# Patient Record
Sex: FEMALE | Race: WHITE | NOT HISPANIC OR LATINO | Employment: OTHER | URBAN - METROPOLITAN AREA
[De-identification: names, ages, dates, MRNs, and addresses within clinical notes are randomized per-mention and may not be internally consistent; named-entity substitution may affect disease eponyms.]

---

## 2024-01-04 DIAGNOSIS — E78.5 HYPERLIPIDEMIA, UNSPECIFIED: ICD-10-CM

## 2024-01-05 DIAGNOSIS — E78.5 HYPERLIPIDEMIA, UNSPECIFIED: ICD-10-CM

## 2024-01-05 DIAGNOSIS — N95.9 POST MENOPAUSAL PROBLEMS: ICD-10-CM

## 2024-01-05 DIAGNOSIS — M47.9 SPONDYLOSIS, UNSPECIFIED: ICD-10-CM

## 2024-01-05 PROBLEM — H91.22 SUDDEN IDIOPATHIC HEARING LOSS OF LEFT EAR WITH UNRESTRICTED HEARING OF RIGHT EAR: Status: ACTIVE | Noted: 2020-06-16

## 2024-01-05 PROBLEM — K63.5 COLON POLYPS: Status: ACTIVE | Noted: 2024-01-05

## 2024-01-05 PROBLEM — E77.8 HYPOPROTEINEMIA (MULTI): Status: ACTIVE | Noted: 2024-01-05

## 2024-01-05 PROBLEM — E83.51 HYPOCALCEMIA: Status: ACTIVE | Noted: 2024-01-05

## 2024-01-05 PROBLEM — J43.2 CENTRILOBULAR EMPHYSEMA (MULTI): Status: ACTIVE | Noted: 2019-04-05

## 2024-01-05 PROBLEM — H93.12 TINNITUS, LEFT EAR: Status: ACTIVE | Noted: 2020-06-09

## 2024-01-05 PROBLEM — D72.820 ATYPICAL LYMPHOCYTOSIS: Status: ACTIVE | Noted: 2024-01-05

## 2024-01-05 PROBLEM — M54.9 BACK PAIN: Status: ACTIVE | Noted: 2024-01-05

## 2024-01-05 PROBLEM — R35.0 URINARY FREQUENCY: Status: ACTIVE | Noted: 2024-01-05

## 2024-01-05 PROBLEM — F17.200 NICOTINE USE DISORDER: Status: ACTIVE | Noted: 2023-01-19

## 2024-01-05 PROBLEM — I34.1 MITRAL VALVE PROLAPSE SYNDROME: Status: ACTIVE | Noted: 2024-01-05

## 2024-01-05 PROBLEM — K21.9 GERD (GASTROESOPHAGEAL REFLUX DISEASE): Status: ACTIVE | Noted: 2024-01-05

## 2024-01-05 PROBLEM — L81.9 HYPERPIGMENTATION: Status: ACTIVE | Noted: 2024-01-05

## 2024-01-05 PROBLEM — R06.02 SHORTNESS OF BREATH: Status: ACTIVE | Noted: 2024-01-05

## 2024-01-05 PROBLEM — D64.9 ANEMIA: Status: ACTIVE | Noted: 2024-01-05

## 2024-01-05 PROBLEM — U07.1 COVID-19 VIRUS INFECTION: Status: ACTIVE | Noted: 2024-01-05

## 2024-01-05 PROBLEM — R42 DIZZINESS AND GIDDINESS: Status: ACTIVE | Noted: 2020-06-09

## 2024-01-05 PROBLEM — H91.8X3 ASYMMETRICAL HEARING LOSS: Status: ACTIVE | Noted: 2020-06-09

## 2024-01-05 PROBLEM — J30.9 ALLERGIC RHINITIS: Status: ACTIVE | Noted: 2024-01-05

## 2024-01-05 PROBLEM — F41.8 DEPRESSION WITH ANXIETY: Status: ACTIVE | Noted: 2024-01-05

## 2024-01-05 PROBLEM — E79.0 HYPERURICEMIA: Status: ACTIVE | Noted: 2024-01-05

## 2024-01-05 PROBLEM — I47.9 PAROXYSMAL TACHYCARDIA (MULTI): Status: ACTIVE | Noted: 2024-01-05

## 2024-01-05 PROBLEM — J44.9 CHRONIC OBSTRUCTIVE PULMONARY DISEASE (MULTI): Status: ACTIVE | Noted: 2023-01-19

## 2024-01-05 PROBLEM — E07.9 THYROID DISORDER: Status: ACTIVE | Noted: 2024-01-05

## 2024-01-05 RX ORDER — ATORVASTATIN CALCIUM 40 MG/1
60 TABLET, FILM COATED ORAL NIGHTLY
Qty: 135 TABLET | Refills: 1 | Status: SHIPPED | OUTPATIENT
Start: 2024-01-05

## 2024-01-05 RX ORDER — ESTROGENS, CONJUGATED 1.25 MG/1
1.25 TABLET, FILM COATED ORAL DAILY
Qty: 90 TABLET | Refills: 3 | Status: SHIPPED | OUTPATIENT
Start: 2024-01-05

## 2024-01-05 RX ORDER — ACETAMINOPHEN 500 MG
2000 TABLET ORAL 2 TIMES DAILY
COMMUNITY

## 2024-01-05 RX ORDER — PANTOPRAZOLE SODIUM 40 MG/1
40 TABLET, DELAYED RELEASE ORAL DAILY
COMMUNITY

## 2024-01-05 RX ORDER — LEVOTHYROXINE SODIUM 88 UG/1
88 TABLET ORAL EVERY OTHER DAY
COMMUNITY

## 2024-01-05 RX ORDER — CETIRIZINE HYDROCHLORIDE 10 MG/1
1 TABLET ORAL DAILY
COMMUNITY

## 2024-01-05 RX ORDER — ESTROGENS, CONJUGATED 1.25 MG/1
1.25 TABLET, FILM COATED ORAL DAILY
COMMUNITY
End: 2024-01-05 | Stop reason: SDUPTHER

## 2024-01-05 RX ORDER — LEVALBUTEROL TARTRATE 45 UG/1
AEROSOL, METERED ORAL
COMMUNITY

## 2024-01-05 RX ORDER — LEVALBUTEROL INHALATION SOLUTION 0.63 MG/3ML
0.63 SOLUTION RESPIRATORY (INHALATION) EVERY 4 HOURS PRN
COMMUNITY
Start: 2023-11-01

## 2024-01-05 RX ORDER — MELOXICAM 15 MG/1
15 TABLET ORAL DAILY
Qty: 90 TABLET | Refills: 1 | Status: SHIPPED | OUTPATIENT
Start: 2024-01-05

## 2024-01-05 RX ORDER — LEVOTHYROXINE SODIUM 75 UG/1
75 TABLET ORAL EVERY OTHER DAY
COMMUNITY

## 2024-01-05 RX ORDER — GLYCOPYRROLATE AND FORMOTEROL FUMARATE 9; 4.8 UG/1; UG/1
2 AEROSOL, METERED RESPIRATORY (INHALATION) 2 TIMES DAILY
COMMUNITY
Start: 2024-01-02 | End: 2024-06-30

## 2024-01-05 RX ORDER — TRETINOIN 0.25 MG/G
CREAM TOPICAL
COMMUNITY
Start: 2021-10-27

## 2024-01-08 ENCOUNTER — OFFICE VISIT (OUTPATIENT)
Dept: PRIMARY CARE | Facility: CLINIC | Age: 69
End: 2024-01-08
Payer: MEDICARE

## 2024-01-08 VITALS
HEIGHT: 65 IN | BODY MASS INDEX: 21.83 KG/M2 | DIASTOLIC BLOOD PRESSURE: 83 MMHG | HEART RATE: 81 BPM | TEMPERATURE: 98 F | WEIGHT: 131 LBS | SYSTOLIC BLOOD PRESSURE: 139 MMHG

## 2024-01-08 DIAGNOSIS — M47.9 OSTEOARTHRITIS OF SPINE, UNSPECIFIED SPINAL OSTEOARTHRITIS COMPLICATION STATUS, UNSPECIFIED SPINAL REGION: ICD-10-CM

## 2024-01-08 DIAGNOSIS — N95.2 POSTMENOPAUSAL ATROPHIC VAGINITIS: ICD-10-CM

## 2024-01-08 DIAGNOSIS — E03.9 HYPOTHYROIDISM, UNSPECIFIED TYPE: ICD-10-CM

## 2024-01-08 DIAGNOSIS — E55.9 VITAMIN D INSUFFICIENCY: ICD-10-CM

## 2024-01-08 DIAGNOSIS — E78.2 MIXED HYPERLIPIDEMIA: Primary | ICD-10-CM

## 2024-01-08 DIAGNOSIS — Z78.0 MENOPAUSE PRESENT: ICD-10-CM

## 2024-01-08 PROCEDURE — 1159F MED LIST DOCD IN RCRD: CPT | Performed by: INTERNAL MEDICINE

## 2024-01-08 PROCEDURE — 1160F RVW MEDS BY RX/DR IN RCRD: CPT | Performed by: INTERNAL MEDICINE

## 2024-01-08 PROCEDURE — 99214 OFFICE O/P EST MOD 30 MIN: CPT | Performed by: INTERNAL MEDICINE

## 2024-01-08 PROCEDURE — 1036F TOBACCO NON-USER: CPT | Performed by: INTERNAL MEDICINE

## 2024-01-08 ASSESSMENT — PATIENT HEALTH QUESTIONNAIRE - PHQ9
1. LITTLE INTEREST OR PLEASURE IN DOING THINGS: NOT AT ALL
SUM OF ALL RESPONSES TO PHQ9 QUESTIONS 1 AND 2: 0
2. FEELING DOWN, DEPRESSED OR HOPELESS: NOT AT ALL

## 2024-01-08 ASSESSMENT — ENCOUNTER SYMPTOMS
SORE THROAT: 0
LIGHT-HEADEDNESS: 0
OCCASIONAL FEELINGS OF UNSTEADINESS: 0
DEPRESSION: 0
COUGH: 0
CHILLS: 0
NAUSEA: 0
PALPITATIONS: 0
VOMITING: 0
LOSS OF SENSATION IN FEET: 0
FEVER: 0
ABDOMINAL PAIN: 0
SHORTNESS OF BREATH: 0
DIZZINESS: 0
DIARRHEA: 0

## 2024-01-08 NOTE — PROGRESS NOTES
"Subjective   Patient ID: Constance Fernandez is a 68 y.o. female who presents for Establish Care.    HPI  Patient sees Dr. Abbott at  and she manages thyroid medications. Quit smoking 2 years ago.  Patient here today to establish care.  She has refills completed already for atorvastatin meloxicam and Premarin .  She sees endocrinology for management of hypothyroidism.  She sees pulmonary medicine for management of COPD.  She sees cardiology and electrophysiology as well.  She is up-to-date on age and gender recommended screening exception of DEXA scan which has been ordered.  She will be due for blood work at the end of February.  Denies any fever, chills, chest pain or shortness of breath with nausea, vomiting diarrhea, abdominal pain.  She had recent COVID-19 infection 10 days ago and has recovered well.    Review of Systems   Constitutional:  Negative for chills and fever.   HENT:  Negative for sore throat.    Eyes:  Negative for visual disturbance.   Respiratory:  Negative for cough and shortness of breath.    Cardiovascular:  Negative for chest pain, palpitations and leg swelling.   Gastrointestinal:  Negative for abdominal pain, diarrhea, nausea and vomiting.   Skin:  Negative for rash.   Neurological:  Negative for dizziness, syncope and light-headedness.       Objective   /83   Pulse 81   Temp 36.7 °C (98 °F) (Temporal)   Ht 1.64 m (5' 4.58\")   Wt 59.4 kg (131 lb)   BMI 22.08 kg/m²     Physical Exam  Vitals and nursing note reviewed.   Constitutional:       General: She is not in acute distress.     Appearance: Normal appearance. She is normal weight. She is not ill-appearing or toxic-appearing.   HENT:      Head: Normocephalic and atraumatic.      Mouth/Throat:      Mouth: Mucous membranes are moist.      Pharynx: Oropharynx is clear. No oropharyngeal exudate.   Eyes:      Extraocular Movements: Extraocular movements intact.      Pupils: Pupils are equal, round, and reactive to light. "   Cardiovascular:      Rate and Rhythm: Normal rate and regular rhythm.      Heart sounds: Normal heart sounds.   Pulmonary:      Effort: Pulmonary effort is normal. No respiratory distress.      Breath sounds: Normal breath sounds. No wheezing.   Abdominal:      General: Abdomen is flat. Bowel sounds are normal. There is no distension.      Palpations: Abdomen is soft.      Tenderness: There is no abdominal tenderness.   Musculoskeletal:      Cervical back: Neck supple. No tenderness.   Skin:     General: Skin is warm and dry.   Neurological:      General: No focal deficit present.      Mental Status: She is alert and oriented to person, place, and time.   Psychiatric:         Mood and Affect: Mood normal.         Behavior: Behavior normal.         Thought Content: Thought content normal.         Judgment: Judgment normal.         Assessment/Plan   Problem List Items Addressed This Visit             ICD-10-CM    Hyperlipidemia - Primary E78.5    Relevant Orders    CBC and Auto Differential    Comprehensive metabolic panel    Lipid panel    Osteoarthritis of spine M47.9    Hypothyroidism E03.9    Vitamin D insufficiency E55.9    Relevant Orders    Vitamin D 25-Hydroxy,Total (for eval of Vitamin D levels)    Menopause present Z78.0    Relevant Orders    XR DEXA bone density    Postmenopausal atrophic vaginitis N95.2     Hyperlipidemia: Chronic, stable refills provided for atorvastatin    Osteoarthritis of spine: Chronic, stable refills provided for meloxicam    Hypothyroidism: Currently managed by endocrinology Dr. White.    Vitamin D insufficiency: We will check a vitamin D level    Postmenopausal atrophic vaginitis: Chronic, stable continue Premarin cream.    Menopause present: We will check a DEXA bone scan

## 2024-02-12 ENCOUNTER — TELEPHONE (OUTPATIENT)
Dept: CARDIOLOGY | Facility: CLINIC | Age: 69
End: 2024-02-12
Payer: MEDICARE

## 2024-02-12 NOTE — TELEPHONE ENCOUNTER
----- Message from Arthur Bronson DO sent at 2/9/2024  2:56 PM EST -----  Bone density was normal.

## 2024-02-23 ENCOUNTER — APPOINTMENT (OUTPATIENT)
Dept: PRIMARY CARE | Facility: CLINIC | Age: 69
End: 2024-02-23
Payer: MEDICARE

## 2024-06-19 DIAGNOSIS — N95.9 POST MENOPAUSAL PROBLEMS: ICD-10-CM

## 2024-06-19 DIAGNOSIS — E78.5 HYPERLIPIDEMIA, UNSPECIFIED: ICD-10-CM

## 2024-06-20 DIAGNOSIS — E78.5 HYPERLIPIDEMIA, UNSPECIFIED: ICD-10-CM

## 2024-06-20 RX ORDER — ATORVASTATIN CALCIUM 40 MG/1
60 TABLET, FILM COATED ORAL NIGHTLY
Qty: 135 TABLET | Refills: 1 | Status: SHIPPED | OUTPATIENT
Start: 2024-06-20

## 2024-06-20 RX ORDER — ATORVASTATIN CALCIUM 40 MG/1
TABLET, FILM COATED ORAL
Qty: 135 TABLET | Refills: 3 | OUTPATIENT
Start: 2024-06-20

## 2024-06-20 RX ORDER — ESTROGENS, CONJUGATED 1.25 MG/1
1.25 TABLET, FILM COATED ORAL DAILY
Qty: 90 TABLET | Refills: 0 | Status: SHIPPED | OUTPATIENT
Start: 2024-06-20

## 2024-06-26 RX ORDER — ATORVASTATIN CALCIUM 40 MG/1
60 TABLET, FILM COATED ORAL NIGHTLY
Qty: 135 TABLET | Refills: 1 | Status: SHIPPED | OUTPATIENT
Start: 2024-06-26

## 2024-07-03 ENCOUNTER — APPOINTMENT (OUTPATIENT)
Dept: PRIMARY CARE | Facility: CLINIC | Age: 69
End: 2024-07-03
Payer: MEDICARE

## 2024-07-03 VITALS
TEMPERATURE: 98 F | BODY MASS INDEX: 22.2 KG/M2 | WEIGHT: 130 LBS | SYSTOLIC BLOOD PRESSURE: 128 MMHG | HEART RATE: 73 BPM | DIASTOLIC BLOOD PRESSURE: 86 MMHG | HEIGHT: 64 IN

## 2024-07-03 DIAGNOSIS — G89.29 CHRONIC PAIN OF RIGHT KNEE: ICD-10-CM

## 2024-07-03 DIAGNOSIS — Z00.00 HEALTH MAINTENANCE EXAMINATION: Primary | ICD-10-CM

## 2024-07-03 DIAGNOSIS — R53.82 CHRONIC FATIGUE: ICD-10-CM

## 2024-07-03 DIAGNOSIS — E55.9 VITAMIN D INSUFFICIENCY: ICD-10-CM

## 2024-07-03 DIAGNOSIS — E78.2 MIXED HYPERLIPIDEMIA: ICD-10-CM

## 2024-07-03 DIAGNOSIS — M25.561 CHRONIC PAIN OF RIGHT KNEE: ICD-10-CM

## 2024-07-03 PROCEDURE — 1159F MED LIST DOCD IN RCRD: CPT | Performed by: INTERNAL MEDICINE

## 2024-07-03 PROCEDURE — G0439 PPPS, SUBSEQ VISIT: HCPCS | Performed by: INTERNAL MEDICINE

## 2024-07-03 PROCEDURE — 1170F FXNL STATUS ASSESSED: CPT | Performed by: INTERNAL MEDICINE

## 2024-07-03 PROCEDURE — 1160F RVW MEDS BY RX/DR IN RCRD: CPT | Performed by: INTERNAL MEDICINE

## 2024-07-03 PROCEDURE — 1158F ADVNC CARE PLAN TLK DOCD: CPT | Performed by: INTERNAL MEDICINE

## 2024-07-03 PROCEDURE — 3008F BODY MASS INDEX DOCD: CPT | Performed by: INTERNAL MEDICINE

## 2024-07-03 PROCEDURE — 1123F ACP DISCUSS/DSCN MKR DOCD: CPT | Performed by: INTERNAL MEDICINE

## 2024-07-03 PROCEDURE — 99213 OFFICE O/P EST LOW 20 MIN: CPT | Performed by: INTERNAL MEDICINE

## 2024-07-03 RX ORDER — TRIAMCINOLONE ACETONIDE 1 MG/G
CREAM TOPICAL
COMMUNITY
Start: 2024-05-06

## 2024-07-03 RX ORDER — ASPIRIN 81 MG/1
81 TABLET ORAL DAILY
COMMUNITY

## 2024-07-03 ASSESSMENT — PATIENT HEALTH QUESTIONNAIRE - PHQ9
2. FEELING DOWN, DEPRESSED OR HOPELESS: NOT AT ALL
1. LITTLE INTEREST OR PLEASURE IN DOING THINGS: NOT AT ALL
SUM OF ALL RESPONSES TO PHQ9 QUESTIONS 1 AND 2: 0

## 2024-07-03 ASSESSMENT — ACTIVITIES OF DAILY LIVING (ADL)
GROOMING: INDEPENDENT
FEEDING YOURSELF: INDEPENDENT
TAKING_MEDICATION: INDEPENDENT
JUDGMENT_ADEQUATE_SAFELY_COMPLETE_DAILY_ACTIVITIES: YES
GROCERY_SHOPPING: INDEPENDENT
DOING_HOUSEWORK: INDEPENDENT
TOILETING: INDEPENDENT
ADEQUATE_TO_COMPLETE_ADL: YES
HEARING - RIGHT EAR: FUNCTIONAL
MANAGING_FINANCES: INDEPENDENT
PATIENT'S MEMORY ADEQUATE TO SAFELY COMPLETE DAILY ACTIVITIES?: YES
BATHING: INDEPENDENT
DRESSING YOURSELF: INDEPENDENT
WALKS IN HOME: INDEPENDENT
HEARING - LEFT EAR: FUNCTIONAL

## 2024-07-03 ASSESSMENT — COLUMBIA-SUICIDE SEVERITY RATING SCALE - C-SSRS
6. HAVE YOU EVER DONE ANYTHING, STARTED TO DO ANYTHING, OR PREPARED TO DO ANYTHING TO END YOUR LIFE?: NO
1. IN THE PAST MONTH, HAVE YOU WISHED YOU WERE DEAD OR WISHED YOU COULD GO TO SLEEP AND NOT WAKE UP?: NO
2. HAVE YOU ACTUALLY HAD ANY THOUGHTS OF KILLING YOURSELF?: NO

## 2024-07-03 ASSESSMENT — ENCOUNTER SYMPTOMS
LOSS OF SENSATION IN FEET: 0
DEPRESSION: 0
OCCASIONAL FEELINGS OF UNSTEADINESS: 0

## 2024-07-03 NOTE — PROGRESS NOTES
Subjective   Reason for Visit: Constance Fernandez is an 69 y.o. female here for a Medicare Wellness visit.     Past Medical, Surgical, and Family History reviewed and updated in chart.    Reviewed all medications by prescribing practitioner or clinical pharmacist (such as prescriptions, OTCs, herbal therapies and supplements) and documented in the medical record.    HPI  Patient sees Dr. Parkinson as her cardiologist. Patient presents to the office today for annual wellness visit.  Patient is up-to-date on age and gender recommended screening as she gets her mammogram and OB/GYN screening through her OB/GYN doctor.  Patient also follows with pulmonary specialist for management of lung nodules.  Patient's last colonoscopy was in 2016.  We discussed patient's use of Premarin for postmenopausal management.  Instructed the patient she will need to follow-up with OB/GYN for further management of this medication.  Patient also complains of chronic right knee pain.  We discussed orthopedics consulted Dr. Clarke acuity the patient request.  Patient seeing endocrinology for management of hypothyroidism.  Patient up-to-date on age and gender recommended vaccinations except shingles vaccine she is recommended to get at her pharmacy.  Also recommended to get the yearly influenza vaccine when she is due.  Patient also due for blood work and orders will be provided.  Bone density was already previously ordered.     Patient Care Team:  Arthur Bronson DO as PCP - General (Internal Medicine)  Jose Espinoza MD as PCP - [facility] Attributed Provider     Review of Systems   Constitutional:  Negative for chills and fever.   HENT:  Negative for sore throat.    Eyes:  Negative for visual disturbance.   Respiratory:  Negative for cough and shortness of breath.    Cardiovascular:  Negative for chest pain, palpitations and leg swelling.   Gastrointestinal:  Negative for abdominal pain, blood in stool, constipation, diarrhea, nausea and vomiting.  "  Genitourinary:  Negative for dysuria and vaginal bleeding.   Skin:  Negative for rash.   Neurological:  Negative for dizziness, syncope and light-headedness.   Psychiatric/Behavioral:  Negative for agitation and confusion.        Objective   Vitals:  /86   Pulse 73   Temp 36.7 °C (98 °F) (Temporal)   Ht 1.626 m (5' 4\")   Wt 59 kg (130 lb)   BMI 22.31 kg/m²       Physical Exam  Vitals and nursing note reviewed.   Constitutional:       General: She is not in acute distress.     Appearance: Normal appearance. She is normal weight. She is not ill-appearing, toxic-appearing or diaphoretic.   HENT:      Head: Normocephalic and atraumatic.      Mouth/Throat:      Mouth: Mucous membranes are moist.      Pharynx: Oropharynx is clear. No oropharyngeal exudate.   Eyes:      Pupils: Pupils are equal, round, and reactive to light.   Cardiovascular:      Rate and Rhythm: Normal rate and regular rhythm.      Heart sounds: Normal heart sounds.   Pulmonary:      Effort: Pulmonary effort is normal. No respiratory distress.      Breath sounds: Normal breath sounds. No stridor.   Chest:      Chest wall: No tenderness.   Abdominal:      General: There is no distension.      Palpations: Abdomen is soft. There is no mass.   Musculoskeletal:      Right lower leg: No edema.      Left lower leg: No edema.   Lymphadenopathy:      Cervical: No cervical adenopathy.   Skin:     General: Skin is warm and dry.      Coloration: Skin is not jaundiced or pale.      Findings: No rash.   Neurological:      General: No focal deficit present.      Mental Status: She is alert. Mental status is at baseline.      Cranial Nerves: No cranial nerve deficit.   Psychiatric:         Mood and Affect: Mood normal.         Behavior: Behavior normal.         Assessment/Plan   Problem List Items Addressed This Visit       Hyperlipidemia    Relevant Orders    Comprehensive metabolic panel    Lipid panel    CBC and Auto Differential    Vitamin D " insufficiency    Chronic fatigue    Relevant Orders    Vitamin B12    Vitamin B6    CBC and Auto Differential    Chronic pain of right knee    Relevant Orders    Referral to Orthopaedic Surgery    Health maintenance examination - Primary     Health maintenance examination: Patient up-to-date on age and gender recommended vaccinations exception of shingles vaccine which is recommended get her pharmacy.  She is up-to-date on age and recommended screening exception of DEXA scan which is already been ordered and screening blood work which has been ordered.    Hyperlipidemia: Will check a lipid panel.  Patient already on atorvastatin 60 mg nightly    Vitamin D insufficiency: Patient currently taking vitamin D supplement    Chronic fatigue: Check vitamin B12 vitamin B6 CBC    Chronic pain of right knee: Patient is been referred to orthopedic surgery for further evaluation and treatment

## 2024-07-31 PROBLEM — Z00.00 HEALTH MAINTENANCE EXAMINATION: Status: ACTIVE | Noted: 2024-07-31

## 2025-01-13 LAB
NON-UH HIE A/G RATIO: 1.1
NON-UH HIE ALB: 3.3 G/DL (ref 3.4–5)
NON-UH HIE ALK PHOS: 73 UNIT/L (ref 45–117)
NON-UH HIE BASO COUNT: 0.06 X1000 (ref 0–0.2)
NON-UH HIE BASOS %: 0.8 %
NON-UH HIE BILIRUBIN, TOTAL: 0.6 MG/DL (ref 0.3–1.2)
NON-UH HIE BUN/CREAT RATIO: 22.5
NON-UH HIE BUN: 18 MG/DL (ref 9–23)
NON-UH HIE CALCIUM: 8.9 MG/DL (ref 8.7–10.4)
NON-UH HIE CALCULATED LDL CHOLESTEROL: 69 MG/DL (ref 60–130)
NON-UH HIE CALCULATED OSMOLALITY: 279 MOSM/KG (ref 275–295)
NON-UH HIE CHLORIDE: 109 MMOL/L (ref 98–107)
NON-UH HIE CHOLESTEROL: 150 MG/DL (ref 100–200)
NON-UH HIE CO2, VENOUS: 22 MMOL/L (ref 20–31)
NON-UH HIE CREATININE: 0.8 MG/DL (ref 0.5–0.8)
NON-UH HIE DIFF?: NO
NON-UH HIE EOS COUNT: 0.14 X1000 (ref 0–0.5)
NON-UH HIE EOSIN %: 1.7 %
NON-UH HIE FREE T4: 1.43 NG/DL (ref 0.89–1.76)
NON-UH HIE GFR AA: >60
NON-UH HIE GLOBULIN: 2.9 G/DL
NON-UH HIE GLOMERULAR FILTRATION RATE: >60 ML/MIN/1.73M?
NON-UH HIE GLUCOSE: 87 MG/DL (ref 74–106)
NON-UH HIE GOT: 20 UNIT/L (ref 15–37)
NON-UH HIE GPT: 18 UNIT/L (ref 10–49)
NON-UH HIE HCT: 39.5 % (ref 36–46)
NON-UH HIE HDL CHOLESTEROL: 58 MG/DL (ref 40–60)
NON-UH HIE HGB: 13.2 G/DL (ref 12–16)
NON-UH HIE INSTR WBC: 8.3
NON-UH HIE K: 4.2 MMOL/L (ref 3.5–5.1)
NON-UH HIE LYMPH %: 41.4 %
NON-UH HIE LYMPH COUNT: 3.45 X1000 (ref 1.2–4.8)
NON-UH HIE MCH: 30.4 PG (ref 27–34)
NON-UH HIE MCHC: 33.3 G/DL (ref 32–37)
NON-UH HIE MCV: 91.1 FL (ref 80–100)
NON-UH HIE MONO %: 7.8 %
NON-UH HIE MONO COUNT: 0.65 X1000 (ref 0.1–1)
NON-UH HIE MPV: 7.6 FL (ref 7.4–10.4)
NON-UH HIE NA: 139 MMOL/L (ref 135–145)
NON-UH HIE NEUTROPHIL %: 48.4 %
NON-UH HIE NEUTROPHIL COUNT (ANC): 4.04 X1000 (ref 1.4–8.8)
NON-UH HIE NUCLEATED RBC: 0 /100WBC
NON-UH HIE PLATELET: 320 X10 (ref 150–450)
NON-UH HIE RBC: 4.33 X10 (ref 4.2–5.4)
NON-UH HIE RDW: 14.4 % (ref 11.5–14.5)
NON-UH HIE TOTAL CHOL/HDL CHOL RATIO: 2.6
NON-UH HIE TOTAL PROTEIN: 6.2 G/DL (ref 5.7–8.2)
NON-UH HIE TRIGLYCERIDES: 115 MG/DL (ref 30–150)
NON-UH HIE TSH: 2.53 UIU/ML (ref 0.55–4.78)
NON-UH HIE VITAMIN B12: 593 PG/ML (ref 211–911)
NON-UH HIE WBC: 8.3 X10 (ref 4.5–11)

## 2025-01-15 LAB — NON-UH HIE VITAMIN B6: 147.3 NMOL/L (ref 20–125)

## 2025-01-20 ENCOUNTER — APPOINTMENT (OUTPATIENT)
Dept: PRIMARY CARE | Facility: CLINIC | Age: 70
End: 2025-01-20
Payer: MEDICARE

## 2025-01-30 ENCOUNTER — APPOINTMENT (OUTPATIENT)
Dept: PRIMARY CARE | Facility: CLINIC | Age: 70
End: 2025-01-30
Payer: MEDICARE

## 2025-01-30 VITALS
DIASTOLIC BLOOD PRESSURE: 70 MMHG | BODY MASS INDEX: 23.05 KG/M2 | HEART RATE: 73 BPM | HEIGHT: 64 IN | WEIGHT: 135 LBS | SYSTOLIC BLOOD PRESSURE: 122 MMHG

## 2025-01-30 DIAGNOSIS — E55.9 VITAMIN D INSUFFICIENCY: ICD-10-CM

## 2025-01-30 DIAGNOSIS — E03.9 HYPOTHYROIDISM, UNSPECIFIED TYPE: ICD-10-CM

## 2025-01-30 DIAGNOSIS — N95.2 POSTMENOPAUSAL ATROPHIC VAGINITIS: ICD-10-CM

## 2025-01-30 DIAGNOSIS — J43.2 CENTRILOBULAR EMPHYSEMA (MULTI): ICD-10-CM

## 2025-01-30 DIAGNOSIS — Z12.11 COLON CANCER SCREENING: ICD-10-CM

## 2025-01-30 DIAGNOSIS — E78.5 HYPERLIPIDEMIA, UNSPECIFIED: Primary | ICD-10-CM

## 2025-01-30 PROCEDURE — 1159F MED LIST DOCD IN RCRD: CPT | Performed by: INTERNAL MEDICINE

## 2025-01-30 PROCEDURE — 1160F RVW MEDS BY RX/DR IN RCRD: CPT | Performed by: INTERNAL MEDICINE

## 2025-01-30 PROCEDURE — 99214 OFFICE O/P EST MOD 30 MIN: CPT | Performed by: INTERNAL MEDICINE

## 2025-01-30 PROCEDURE — 1170F FXNL STATUS ASSESSED: CPT | Performed by: INTERNAL MEDICINE

## 2025-01-30 PROCEDURE — 1158F ADVNC CARE PLAN TLK DOCD: CPT | Performed by: INTERNAL MEDICINE

## 2025-01-30 PROCEDURE — G2211 COMPLEX E/M VISIT ADD ON: HCPCS | Performed by: INTERNAL MEDICINE

## 2025-01-30 PROCEDURE — 1123F ACP DISCUSS/DSCN MKR DOCD: CPT | Performed by: INTERNAL MEDICINE

## 2025-01-30 PROCEDURE — 3008F BODY MASS INDEX DOCD: CPT | Performed by: INTERNAL MEDICINE

## 2025-01-30 RX ORDER — MELOXICAM 15 MG/1
15 TABLET ORAL EVERY OTHER DAY
COMMUNITY
Start: 2024-09-25

## 2025-01-30 RX ORDER — FLUTICASONE FUROATE, UMECLIDINIUM BROMIDE AND VILANTEROL TRIFENATATE 200; 62.5; 25 UG/1; UG/1; UG/1
1 POWDER RESPIRATORY (INHALATION)
COMMUNITY
Start: 2024-11-08

## 2025-01-30 RX ORDER — ATORVASTATIN CALCIUM 40 MG/1
60 TABLET, FILM COATED ORAL NIGHTLY
Qty: 135 TABLET | Refills: 1 | Status: SHIPPED | OUTPATIENT
Start: 2025-01-30

## 2025-01-30 ASSESSMENT — PATIENT HEALTH QUESTIONNAIRE - PHQ9
SUM OF ALL RESPONSES TO PHQ9 QUESTIONS 1 AND 2: 0
1. LITTLE INTEREST OR PLEASURE IN DOING THINGS: NOT AT ALL
2. FEELING DOWN, DEPRESSED OR HOPELESS: NOT AT ALL

## 2025-01-30 ASSESSMENT — ACTIVITIES OF DAILY LIVING (ADL)
DRESSING: INDEPENDENT
BATHING: INDEPENDENT
MANAGING_FINANCES: INDEPENDENT
TAKING_MEDICATION: INDEPENDENT
DOING_HOUSEWORK: INDEPENDENT
GROCERY_SHOPPING: INDEPENDENT

## 2025-01-30 NOTE — ASSESSMENT & PLAN NOTE
Orders:    atorvastatin (Lipitor) 40 mg tablet; Take 1.5 tablets (60 mg) by mouth once daily at bedtime.

## 2025-01-30 NOTE — PROGRESS NOTES
Subjective   Reason for Visit: Constance Fernandez is an 69 y.o. female here for a routine follow-up 6 month.     Past Medical, Surgical, and Family History reviewed and updated in chart.    Reviewed all medications by prescribing practitioner or clinical pharmacist (such as prescriptions, OTCs, herbal therapies and supplements) and documented in the medical record.    HPI patient is a 70-year-old female that presents to the office today for 6 month follow-up.  Past medical history of central lobar emphysema, COPD, allergic rhinitis, anemia, hyperlipidemia, GERD, depression with anxiety, mitral valve prolapse syndrome, nicotine use, vitamin D insufficiency, chronic fatigue.  Patient is not up-to-date on age and gender recommended screening she is due for colon cancer screening for which she would like to complete Cologuard.  Patient up-to-date on mammogram having had this on 8/26/2024 and was negative.  Patient up-to-date on CT lung cancer screening was completed on June 17, 2024.  Patient had normal bone density on 2/7/2024.  Patient up-to-date on age and gender recommend immunizations exception of shingles vaccine which I recommend she get her pharmacy.      Did obtain blood work on 1/13/2025 patient's platelet count 320,000, white blood cell count 8.3, hemoglobin 13.2, thyroid was 2.53, CMP showed GFR greater than 60, potassium 4.2, albumin 3.3 chloride 109, creatinine 0.8, lipid panel showed HDL cholesterol 58, triglycerides 115, total cholesterol 150, LDL 69, vitamin B 12 level was 593, vitamin B6 was 147.3    Patient Care Team:  Arthur Bronson DO as PCP - General (Internal Medicine)     Review of Systems   Constitutional:  Negative for chills and fever.   HENT:  Negative for sore throat.    Eyes:  Negative for visual disturbance.   Respiratory:  Negative for cough and shortness of breath.    Cardiovascular:  Negative for chest pain, palpitations and leg swelling.   Gastrointestinal:  Negative for abdominal  "pain, blood in stool, diarrhea, nausea and vomiting.   Genitourinary:  Negative for dysuria and hematuria.   Skin:  Negative for rash.   Neurological:  Negative for dizziness, syncope and light-headedness.   Psychiatric/Behavioral:  Negative for agitation and confusion.        Objective   Vitals:  /70   Pulse 73   Ht 1.626 m (5' 4\")   Wt 61.2 kg (135 lb)   BMI 23.17 kg/m²       Physical Exam  Vitals and nursing note reviewed.   Constitutional:       General: She is not in acute distress.     Appearance: Normal appearance. She is normal weight. She is not ill-appearing, toxic-appearing or diaphoretic.   HENT:      Head: Normocephalic and atraumatic.      Nose: No rhinorrhea.      Mouth/Throat:      Mouth: Mucous membranes are moist.      Pharynx: Oropharynx is clear.   Eyes:      Extraocular Movements: Extraocular movements intact.      Pupils: Pupils are equal, round, and reactive to light.   Cardiovascular:      Rate and Rhythm: Normal rate and regular rhythm.      Heart sounds: Normal heart sounds.   Pulmonary:      Effort: Pulmonary effort is normal. No respiratory distress.      Breath sounds: Normal breath sounds. No wheezing, rhonchi or rales.   Abdominal:      General: There is no distension.      Palpations: Abdomen is soft. There is no mass.      Tenderness: There is no abdominal tenderness. There is no guarding.   Musculoskeletal:      Cervical back: Neck supple.      Right lower leg: No edema.      Left lower leg: No edema.   Skin:     General: Skin is warm and dry.      Coloration: Skin is not jaundiced or pale.      Findings: No rash.   Neurological:      General: No focal deficit present.      Mental Status: She is alert and oriented to person, place, and time. Mental status is at baseline.   Psychiatric:         Mood and Affect: Mood normal.         Behavior: Behavior normal.         Thought Content: Thought content normal.         Judgment: Judgment normal.       Assessment & " Plan  Hyperlipidemia, unspecified    Orders:    atorvastatin (Lipitor) 40 mg tablet; Take 1.5 tablets (60 mg) by mouth once daily at bedtime.    Colon cancer screening    Orders:    Cologuard® colon cancer screening; Future    Vitamin D insufficiency         Hypothyroidism, unspecified type         Postmenopausal atrophic vaginitis         Centrilobular emphysema (Multi)          Health maintenance examination: Patient is up-to-date on age and gender recommended screening but is due for colon cancer screening which will be ordered in the form of Cologuard.  Patient is up-to-date on age and gender recommend immunizations except shingles vaccine which I recommend she get her pharmacy.    Hyperlipidemia: Chronic, stable.  Refills provided for atorvastatin.    Colon cancer screening: Cologuard has been ordered    Vitamin D insufficiency: Will check a vitamin D level    Hypothyroidism: Chronic, stable and managed by outside provider    Post menopausal atrophic vaginitis: Patient on Premarin provided with refills.  Discussed provide to gradually decrease the dose of this medication.  Typically the risks of being on the medication outweigh the benefits in patients in her age group. Therefore we discussed a slow taper of the medication.  If patient not agreeable to this will need to see ob/gyn for management.     Central lobar emphysema: Patient will continue Trelegy.  Patient follows with a pulmonologist for this.

## 2025-02-19 LAB — NONINV COLON CA DNA+OCC BLD SCRN STL QL: NEGATIVE

## 2025-02-20 NOTE — RESULT ENCOUNTER NOTE
Cologuard test was negative.  This means a low likelihood that a colon cancer or precancer is present.  Repeat screening is recommended in 3 years.

## 2025-04-12 DIAGNOSIS — N95.9 POST MENOPAUSAL PROBLEMS: ICD-10-CM

## 2025-04-14 RX ORDER — ESTROGENS, CONJUGATED 0.9 MG/1
0.9 TABLET, FILM COATED ORAL DAILY
Qty: 90 TABLET | Refills: 0 | Status: SHIPPED | OUTPATIENT
Start: 2025-04-14

## 2025-04-28 ASSESSMENT — ENCOUNTER SYMPTOMS
FEVER: 0
NAUSEA: 0
COUGH: 0
HEMATURIA: 0
DIARRHEA: 0
AGITATION: 0
CHILLS: 0
CONFUSION: 0
LIGHT-HEADEDNESS: 0
VOMITING: 0
DYSURIA: 0
ABDOMINAL PAIN: 0
BLOOD IN STOOL: 0
SORE THROAT: 0
PALPITATIONS: 0
DIZZINESS: 0
SHORTNESS OF BREATH: 0

## 2025-06-20 DIAGNOSIS — Z12.31 ENCOUNTER FOR SCREENING MAMMOGRAM FOR MALIGNANT NEOPLASM OF BREAST: Primary | ICD-10-CM

## 2025-06-30 ENCOUNTER — PATIENT OUTREACH (OUTPATIENT)
Dept: PRIMARY CARE | Facility: CLINIC | Age: 70
End: 2025-06-30
Payer: MEDICARE

## 2025-06-30 NOTE — PROGRESS NOTES
Discharge Facility:  [facility]   Discharge Diagnosis:  Dizziness, elevated blood pressure without diagnosis of hypertension   Possible heat related illness   Dyslipidemia, hypothyroidism   History of vertigo in the past     Admission Date:  6/25/25   Discharge Date:   6/27/25     PCP Appointment Date:  Appointment with Arthur Bronson DO (07/09/2025)   Specialist Appointment Date:   TBD  Hospital Encounter and Summary Linked: Yes  Scanned Document with Generic Provider Scanning (06/25/2025)   Jun 25  Unknown with LEFTY MULLIGAN; TARIK PITTS     Two attempts were made to reach patient within two business days after discharge. I left voicemail to introduce myself and the TCM program to Constance Fernandez. I encouraged patient to contact office or myself for follow up appt. I gave my contact information to return my call so we can go over discharge instructions and see if I can assist in anyway.     [Follow - Up] : a follow-up visit [DM Type 2] : DM Type 2 [Thyroid Cancer] : thyroid cancer

## 2025-07-05 DIAGNOSIS — M25.561 CHRONIC PAIN OF RIGHT KNEE: ICD-10-CM

## 2025-07-05 DIAGNOSIS — M47.9 OSTEOARTHRITIS OF SPINE, UNSPECIFIED SPINAL OSTEOARTHRITIS COMPLICATION STATUS, UNSPECIFIED SPINAL REGION: Primary | ICD-10-CM

## 2025-07-05 DIAGNOSIS — G89.29 CHRONIC PAIN OF RIGHT KNEE: ICD-10-CM

## 2025-07-09 ENCOUNTER — APPOINTMENT (OUTPATIENT)
Dept: PRIMARY CARE | Facility: CLINIC | Age: 70
End: 2025-07-09
Payer: MEDICARE

## 2025-07-09 VITALS
BODY MASS INDEX: 22.67 KG/M2 | DIASTOLIC BLOOD PRESSURE: 84 MMHG | WEIGHT: 132.8 LBS | SYSTOLIC BLOOD PRESSURE: 149 MMHG | HEART RATE: 69 BPM | TEMPERATURE: 96.2 F | HEIGHT: 64 IN

## 2025-07-09 DIAGNOSIS — Z09 HOSPITAL DISCHARGE FOLLOW-UP: ICD-10-CM

## 2025-07-09 DIAGNOSIS — N95.9 POST MENOPAUSAL PROBLEMS: ICD-10-CM

## 2025-07-09 DIAGNOSIS — I10 PRIMARY HYPERTENSION: Primary | ICD-10-CM

## 2025-07-09 DIAGNOSIS — R42 LIGHTHEADEDNESS: ICD-10-CM

## 2025-07-09 PROCEDURE — 3008F BODY MASS INDEX DOCD: CPT | Performed by: INTERNAL MEDICINE

## 2025-07-09 PROCEDURE — 1160F RVW MEDS BY RX/DR IN RCRD: CPT | Performed by: INTERNAL MEDICINE

## 2025-07-09 PROCEDURE — 1036F TOBACCO NON-USER: CPT | Performed by: INTERNAL MEDICINE

## 2025-07-09 PROCEDURE — 1159F MED LIST DOCD IN RCRD: CPT | Performed by: INTERNAL MEDICINE

## 2025-07-09 PROCEDURE — 99495 TRANSJ CARE MGMT MOD F2F 14D: CPT | Performed by: INTERNAL MEDICINE

## 2025-07-09 PROCEDURE — 3077F SYST BP >= 140 MM HG: CPT | Performed by: INTERNAL MEDICINE

## 2025-07-09 PROCEDURE — 3079F DIAST BP 80-89 MM HG: CPT | Performed by: INTERNAL MEDICINE

## 2025-07-09 RX ORDER — AMLODIPINE BESYLATE 2.5 MG/1
2.5 TABLET ORAL DAILY
Qty: 90 TABLET | Refills: 1 | Status: SHIPPED | OUTPATIENT
Start: 2025-07-09 | End: 2025-07-09

## 2025-07-09 RX ORDER — AMLODIPINE BESYLATE 2.5 MG/1
2.5 TABLET ORAL DAILY
Qty: 30 TABLET | Refills: 2 | Status: SHIPPED | OUTPATIENT
Start: 2025-07-09 | End: 2025-10-07

## 2025-07-09 NOTE — PROGRESS NOTES
"Patient: Constance Fernandez  : 1955  PCP: Arthur Bronson DO  MRN: [id number]  Program: Transitional Care Management  Status: Enrolled  Effective Dates: 2025 - present  Responsible Staff: Bess Vera LPN  Social Drivers to be Addressed: No information to display         Constance Fernandez is a 70 y.o. female presenting today for follow-up after being discharged from the hospital 13 days ago. The main problem requiring admission was lightheadedness and elevated blood pressure. The discharge summary and/or Transitional Care Management documentation was reviewed. Medication reconciliation was performed as indicated via the \"Reymundo as Reviewed\" timestamp.     Constance Fernandez was contacted by Transitional Care Management services two days after her discharge. This encounter and supporting documentation was reviewed.    /84 (BP Location: Left arm, Patient Position: Sitting, BP Cuff Size: Adult)   Pulse 69   Temp 35.7 °C (96.2 °F)   Ht 1.626 m (5' 4\")   Wt 60.2 kg (132 lb 12.8 oz)   BMI 22.80 kg/m²     History of Present Illness  Constance Fernandez is a 70 year old female with a history of vertigo who presents with recent episodes of lightheadedness and elevated blood pressure.  The symptoms she was having to let her go to the ER were not the same as vertigo.    She experiences lightheadedness upon getting up in the morning, which occurred last week. The sensation is different from her usual vertigo, which she has had for years. She typically sits on the edge of her bed, takes her thyroid medication, stretches, and then gets up slowly. Despite this routine, she felt lightheaded on that particular morning.    During her hospital stay at [facility], a CT scan and MRI were performed. The MRI showed some changes in the left white matter. A carotid ultrasound was also performed. Her blood pressure was high during the episode, and she was given blood pressure medication in the hospital, which was later " stopped after her blood pressure became low. She has been on baby aspirin for a long time and takes Synthroid (levothyroxine) for her thyroid. She also takes meloxicam and has been monitoring her blood pressure in relation to its use. Her blood pressure readings have been fluctuating, with some readings as low as 90/60 and others as high as 170/?. Previous office visits showed normal readings.    She has a history of COPD and was previously diagnosed with asthma. She uses Trelegy, which she finds effective for both conditions. She has a history of smoking and undergoes annual lung cancer screenings, which have shown stable nodules with no changes.    She rarely consumes alcohol, only on special occasions like [place]. She has a sty on her left lower eyelid, which has been present for about a week and a half. No headaches, double vision, blindness, hearing loss, sweating, vomiting, chest pain, shortness of breath, numbness, slurred speech, or facial droop.    Review of Systems  12 Systems have been reviewed as follows.  Constitutional: Fever, weight gain, weight loss, appetite change, night sweats, fatigue, chills.  Eyes : blurry, double vision, vision, loss, tearing, redness, pain, sensitivity to light, glaucoma.  Ears, nose, mouth, and throat: Hearing loss, ringing in the ears, ear pain, nasal congestion, nasal drainage, nosebleeds, mouth, throat, irritation tooth problem.  Cardiovascular :chest pain, pressure, heart racing, palpitations, sweating, leg swelling, high or low blood pressure  Pulmonary: Cough, yellow or green sputum, blood and sputum, shortness of breath, wheezing  Gastrointestinal: Nausea, vomiting, diarrhea, constipation, pain, blood in stool, or vomitus, heartburn, difficulty swallowing  Genitourinary: incontinence, abnormal bleeding, abnormal discharge, urinary frequency, urinary hesitancy, pain, impotence sexual problem, infection, urinary retention  Musculoskeletal: Pain, stiffness, joint,  redness or warmth, arthritis, back pain, weakness, muscle wasting, sprain or fracture  Neuro: Weight weakness, dizziness, change in voice, change in taste change in vision, change in hearing, loss, or change of sensation, trouble walking, balance problems coordination problems, shaking, speech problem  Endocrine , cold or heat intolerance, blood sugar problem, weight gain or loss missed periods hot flashes, sweats, change in body hair, change in libido, increased thirst, increased urination  Heme/lymph: Swelling, bleeding, problem anemia, bruising, enlarged lymph nodes  Allergic/immunologic: H. plus nasal drip, watery itchy eyes, nasal drainage, immunosuppressed  The above were reviewed and noted negative except as noted in HPI and Problem List.      Physical Exam  VITALS: BP- 149/84  HEENT: Sty present on left lower eyelid. No conjunctivitis. Dry skin in ear canal.  CHEST: Lungs clear to auscultation bilaterally.  CARDIOVASCULAR: Heart sounds normal.  Constitutional: Well developed, well nourished, alert and in no acute distress   Eyes: Normal external exam. Pupils equally round and reactive to light with normal accommodation and extraocular movements intact.  Neck: Supple, no lymphadenopathy or masses.   Cardiovascular: Regular rate and rhythm, normal S1 and S2, no murmurs, gallops, or rubs. Radial pulses normal. No peripheral edema.  Pulmonary: No respiratory distress, lungs clear to auscultation bilaterally. No wheezes, rhonchi, rales.  Abdomen: soft,non tender, non distended, without masses or HSM  Skin: Warm, well perfused, normal skin turgor and color.   Neurologic: Cranial nerves II-XII grossly intact.   Psychiatric: Mood calm and affect normal  Musculoskeletal: Moving all extremities without restriction  The above were reviewed and noted negative except as noted in HPI and Problem List.  The complexity of medical decision making for this patient's transitional care is moderate.      Subjective     Objective      Results  LABS  Hb: 13.7 g/dL (06/25/2025)  Hemoglobin A1c: 5.6% (06/25/2025)  Triglycerides: 189 mg/dL (06/25/2025)  Total cholesterol: 136 mg/dL (06/25/2025)  PLT: 299,000/µL (06/25/2025)  Troponin: 6 ng/L (06/25/2025)  Liver enzymes: Normal (06/25/2025)    RADIOLOGY  CT of the brain: Age indeterminate lacunar infarct, atrophy, nonspecific white matter changes (06/25/2025)  Carotid ultrasound: No blockages (06/25/2025)  MRI of the brain without contrast: No obvious findings, suspicious for tiny chronic ischemic changes involving the left inferior thalamus or left cerebral peduncle, no evidence of restricted diffusion (06/25/2025)  CT of the lungs: No changes in nodules (06/2025)       Assessment & Plan  Hospital discharge follow-up: Discharge summary, neurology consultation and internal medicine H&P were reviewed.  She had lightheadedness in the inciting factor is unclear.  Currently not having any headaches or neurological deficits on exam.    Hypertension  She experienced an episode of lightheadedness with elevated blood pressure, initially managed with amlodipine. No vertigo, headache, or neurological symptoms were present. MRI and CT scans showed no acute findings, but chronic ischemic changes in the white matter were noted, likely due to long-standing hypertension. Blood pressure fluctuates with episodes of both high and low readings, potentially influenced by stress and meloxicam use. Amlodipine was effective, but the dose may have been too high, causing hypotension.  - Initiate low-dose amlodipine 2.5 mg daily.  - Monitor blood pressure at home and record readings.  - Follow up in 4-6 weeks with blood pressure log to assess treatment effectiveness.  - Consider discontinuing meloxicam to evaluate its effect on blood pressure.  - Send a short course of [facility]lodipine to [facility] for trial before long-term prescription.    Chronic Obstructive Pulmonary Disease (COPD)  COPD is managed with Trelegy, which also  addresses her asthma. Recent lung cancer screening showed no changes in existing nodules. She is under regular follow-up with a pulmonologist.  - Continue Trelegy for COPD management.  - Follow up with pulmonologist in November.    Thyroid Disorder  She is on levothyroxine (Synthroid) for thyroid management, prescribed by her endocrinologist, Dr. Abbott.  - Continue current dose of levothyroxine as prescribed by endocrinologist.    Postmenopausal problems: She remains on estrogen therapy we have discussed tapering to the dose of this medication in the past we will plan on the next dose taper as well.  We have been able to lower the dose of the medication successfully.    General Health Maintenance  She is due for routine health maintenance screenings, including a mammogram. She undergoes annual lung cancer screenings due to her smoking history.  - Order mammogram at her preferred facility.  - Continue annual lung cancer screenings.      Arthur Bronson, DO     This medical note was created with the assistance of artificial intelligence (AI) for documentation purposes. The content has been reviewed and confirmed by the healthcare provider for accuracy and completeness. Patient consented to the use of audio recording and use of AI during their visit.

## 2025-07-15 RX ORDER — MELOXICAM 15 MG/1
15 TABLET ORAL DAILY
Qty: 90 TABLET | Refills: 0 | Status: SHIPPED | OUTPATIENT
Start: 2025-07-15

## 2025-07-18 ENCOUNTER — PATIENT OUTREACH (OUTPATIENT)
Dept: PRIMARY CARE | Facility: CLINIC | Age: 70
End: 2025-07-18
Payer: MEDICARE

## 2025-07-23 LAB
NON-UH HIE A/G RATIO: 1.2
NON-UH HIE ALB: 3.5 G/DL (ref 3.4–5)
NON-UH HIE ALK PHOS: 75 UNIT/L (ref 45–117)
NON-UH HIE BASO COUNT: 0.06 X1000 (ref 0–0.2)
NON-UH HIE BASOS %: 0.7 %
NON-UH HIE BILIRUBIN, TOTAL: 0.6 MG/DL (ref 0.3–1.2)
NON-UH HIE BUN/CREAT RATIO: 24.3
NON-UH HIE BUN: 17 MG/DL (ref 9–23)
NON-UH HIE CALCIUM: 8.9 MG/DL (ref 8.7–10.4)
NON-UH HIE CALCULATED LDL CHOLESTEROL: 64 MG/DL (ref 60–130)
NON-UH HIE CALCULATED OSMOLALITY: 286 MOSM/KG (ref 275–295)
NON-UH HIE CHLORIDE: 106 MMOL/L (ref 98–107)
NON-UH HIE CHOLESTEROL: 156 MG/DL (ref 100–200)
NON-UH HIE CO2, VENOUS: 27 MMOL/L (ref 20–31)
NON-UH HIE CREATININE: 0.7 MG/DL (ref 0.5–0.8)
NON-UH HIE DIFF?: NORMAL
NON-UH HIE EOS COUNT: 0.09 X1000 (ref 0–0.5)
NON-UH HIE EOSIN %: 1.2 %
NON-UH HIE FREE T4: 1.67 NG/DL (ref 0.89–1.76)
NON-UH HIE GFR AA: >60
NON-UH HIE GLOBULIN: 3 G/DL
NON-UH HIE GLOMERULAR FILTRATION RATE: >60 ML/MIN/1.73M?
NON-UH HIE GLUCOSE: 85 MG/DL (ref 74–106)
NON-UH HIE GOT: 18 UNIT/L (ref 15–37)
NON-UH HIE GPT: 14 UNIT/L (ref 10–49)
NON-UH HIE HCT: 40.9 % (ref 36–46)
NON-UH HIE HDL CHOLESTEROL: 61 MG/DL (ref 40–60)
NON-UH HIE HGB: 13.6 G/DL (ref 12–16)
NON-UH HIE INSTR WBC: 7.8
NON-UH HIE K: 4 MMOL/L (ref 3.5–5.1)
NON-UH HIE LYMPH %: 40.9 %
NON-UH HIE LYMPH COUNT: 3.2 X1000 (ref 1.2–4.8)
NON-UH HIE MCH: 30.6 PG (ref 27–34)
NON-UH HIE MCHC: 33.3 G/DL (ref 32–37)
NON-UH HIE MCV: 91.9 FL (ref 80–100)
NON-UH HIE MONO %: 7.7 %
NON-UH HIE MONO COUNT: 0.6 X1000 (ref 0.1–1)
NON-UH HIE MPV: 7.7 FL (ref 7.4–10.4)
NON-UH HIE NA: 143 MMOL/L (ref 135–145)
NON-UH HIE NEUTROPHIL %: 49.5 %
NON-UH HIE NEUTROPHIL COUNT (ANC): 3.87 X1000 (ref 1.4–8.8)
NON-UH HIE NUCLEATED RBC: 0 /100WBC
NON-UH HIE PLATELET: 323 X10 (ref 150–450)
NON-UH HIE RBC: 4.45 X10 (ref 4.2–5.4)
NON-UH HIE RDW: 14.2 % (ref 11.5–14.5)
NON-UH HIE TOTAL CHOL/HDL CHOL RATIO: 2.6
NON-UH HIE TOTAL PROTEIN: 6.5 G/DL (ref 5.7–8.2)
NON-UH HIE TRIGLYCERIDES: 155 MG/DL (ref 30–150)
NON-UH HIE TSH: 2.64 UIU/ML (ref 0.55–4.78)
NON-UH HIE VIT D 25: 36 NG/ML
NON-UH HIE WBC: 7.8 X10 (ref 4.5–11)

## 2025-07-31 DIAGNOSIS — I10 PRIMARY HYPERTENSION: ICD-10-CM

## 2025-08-01 RX ORDER — AMLODIPINE BESYLATE 2.5 MG/1
2.5 TABLET ORAL DAILY
Qty: 90 TABLET | Refills: 1 | Status: SHIPPED | OUTPATIENT
Start: 2025-08-01 | End: 2025-10-30

## 2025-08-11 ENCOUNTER — APPOINTMENT (OUTPATIENT)
Dept: PRIMARY CARE | Facility: CLINIC | Age: 70
End: 2025-08-11
Payer: MEDICARE

## 2025-08-11 VITALS
WEIGHT: 132.8 LBS | HEART RATE: 80 BPM | DIASTOLIC BLOOD PRESSURE: 78 MMHG | BODY MASS INDEX: 22.67 KG/M2 | SYSTOLIC BLOOD PRESSURE: 131 MMHG | HEIGHT: 64 IN

## 2025-08-11 DIAGNOSIS — E55.9 VITAMIN D INSUFFICIENCY: ICD-10-CM

## 2025-08-11 DIAGNOSIS — I10 PRIMARY HYPERTENSION: Primary | ICD-10-CM

## 2025-08-11 DIAGNOSIS — E78.2 MIXED HYPERLIPIDEMIA: ICD-10-CM

## 2025-08-11 PROCEDURE — G2211 COMPLEX E/M VISIT ADD ON: HCPCS | Performed by: INTERNAL MEDICINE

## 2025-08-11 PROCEDURE — 3075F SYST BP GE 130 - 139MM HG: CPT | Performed by: INTERNAL MEDICINE

## 2025-08-11 PROCEDURE — 3008F BODY MASS INDEX DOCD: CPT | Performed by: INTERNAL MEDICINE

## 2025-08-11 PROCEDURE — 1160F RVW MEDS BY RX/DR IN RCRD: CPT | Performed by: INTERNAL MEDICINE

## 2025-08-11 PROCEDURE — 1159F MED LIST DOCD IN RCRD: CPT | Performed by: INTERNAL MEDICINE

## 2025-08-11 PROCEDURE — 99213 OFFICE O/P EST LOW 20 MIN: CPT | Performed by: INTERNAL MEDICINE

## 2025-08-11 PROCEDURE — 3078F DIAST BP <80 MM HG: CPT | Performed by: INTERNAL MEDICINE

## 2025-08-11 ASSESSMENT — PATIENT HEALTH QUESTIONNAIRE - PHQ9
1. LITTLE INTEREST OR PLEASURE IN DOING THINGS: NOT AT ALL
2. FEELING DOWN, DEPRESSED OR HOPELESS: NOT AT ALL
SUM OF ALL RESPONSES TO PHQ9 QUESTIONS 1 AND 2: 0

## 2025-08-11 ASSESSMENT — ENCOUNTER SYMPTOMS
ABDOMINAL PAIN: 0
PALPITATIONS: 0
FEVER: 0
SHORTNESS OF BREATH: 0
DYSURIA: 0
LIGHT-HEADEDNESS: 0
COUGH: 0
CONFUSION: 0
HEMATURIA: 0
VOMITING: 0
NAUSEA: 0
BLOOD IN STOOL: 0
DIARRHEA: 0
SORE THROAT: 0
AGITATION: 0
CHILLS: 0
DIZZINESS: 0